# Patient Record
(demographics unavailable — no encounter records)

---

## 2024-12-20 NOTE — DISCUSSION/SUMMARY
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 We discussed surgical treatment. Right hip arthroscopy labral repair femoroplasty possible acetabuloplasty.  Will arrange for ultrasound-guided corticosteroid injection for diagnostic therapeutic purposes continue with physical therapy follow-up 2 months if not improved we can again discuss hip arthroscopy
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
## 2024-12-20 NOTE — PHYSICAL EXAM
[de-identified] : General Exam   Well developed, well nourished No apparent distress Oriented to person, place, and time Mood: Normal Affect: Normal Balance and coordination: Normal Gait: Normal  Right hip exam   Skin: Clean/dry and intact Inspection: No obvious deformity, no swelling, no ecchymosis. Tenderness:  no tenderness over greater trochanter/glut medius insertion. No tenderness pubic symphysis, pubic tubercle, hip flexors. No ttp ischial tuberosity or buttock. No ttp over the ASIS/Illiac crest. ROM: 0-120. Internal rotation 30 external rotation 70 Painful ROM: None Additional tests: No pain with circumduction positive impingement test at 90 mildly positive impingement test at 60 negative Michael negative [facility] Strength: 5/5 hip flexion/ADD/ABD/Q/H/TA/GS/EHL Neuro: Sensation in tact to light touch throughout in dp/sp/tib/sean/saph distributions Pulses: 2+ DP/PT pulses [de-identified] : The following radiographs were ordered and read by me during this patients visit. I reviewed each radiograph in detail with the patient and discussed the findings as highlighted below.  AP pelvis AP lateral Sawyer false profile view right hip were obtained today there is slight decrease in femoral head neck offset joint spaces are well-maintained  Right hip MRI reviewed there is a slight decrease in femoral head neck offset.  There is a labral tear  MRI pelvis MRI thigh MRI of the lumbar spine MRI thoracic spine were reviewed.  Results scanned into the chart
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
## 2025-02-14 NOTE — DISCUSSION/SUMMARY
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 We discussed surgical treatment. Right hip arthroscopy labral repair femoroplasty possible acetabuloplasty. We discussed the surgery postoperative protocol restrictions at length. Response alternatives discussed including but limited to risks such as bleeding infection nerve and vessel injury continued pain stiffness retear need for future surgery medical complications such as DVT or PE and anesthesia complications. All questions were answered he will schedule at his convenience.

## 2025-02-14 NOTE — PHYSICAL EXAM
[de-identified] : General Exam   Well developed, well nourished No apparent distress Oriented to person, place, and time Mood: Normal Affect: Normal Balance and coordination: Normal Gait: Normal  Right hip exam   Skin: Clean/dry and intact Inspection: No obvious deformity, no swelling, no ecchymosis. Tenderness:  no tenderness over greater trochanter/glut medius insertion. No tenderness pubic symphysis, pubic tubercle, hip flexors. No ttp ischial tuberosity or buttock. No ttp over the ASIS/Illiac crest. ROM: 0-120. Internal rotation 30 external rotation 70 Painful ROM: None Additional tests: No pain with circumduction positive impingement test at 90 mildly positive impingement test at 60 negative Michael negative [facility] Strength: 5/5 hip flexion/ADD/ABD/Q/H/TA/GS/EHL Neuro: Sensation in tact to light touch throughout in dp/sp/tib/sean/saph distributions Pulses: 2+ DP/PT pulses [de-identified] : Right hip MRI reviewed there is a slight decrease in femoral head neck offset.  There is a labral tear  MRI pelvis MRI thigh MRI of the lumbar spine MRI thoracic spine were reviewed.  Results scanned into the chart